# Patient Record
Sex: MALE | Race: OTHER | NOT HISPANIC OR LATINO
[De-identification: names, ages, dates, MRNs, and addresses within clinical notes are randomized per-mention and may not be internally consistent; named-entity substitution may affect disease eponyms.]

---

## 2017-02-24 ENCOUNTER — TRANSCRIPTION ENCOUNTER (OUTPATIENT)
Age: 27
End: 2017-02-24

## 2017-05-24 ENCOUNTER — TRANSCRIPTION ENCOUNTER (OUTPATIENT)
Age: 27
End: 2017-05-24

## 2017-06-12 ENCOUNTER — TRANSCRIPTION ENCOUNTER (OUTPATIENT)
Age: 27
End: 2017-06-12

## 2019-02-05 ENCOUNTER — APPOINTMENT (OUTPATIENT)
Dept: UROLOGY | Facility: CLINIC | Age: 29
End: 2019-02-05
Payer: MEDICARE

## 2019-02-05 VITALS
SYSTOLIC BLOOD PRESSURE: 122 MMHG | BODY MASS INDEX: 39.99 KG/M2 | WEIGHT: 270 LBS | RESPIRATION RATE: 16 BRPM | HEIGHT: 69 IN | DIASTOLIC BLOOD PRESSURE: 88 MMHG | HEART RATE: 61 BPM

## 2019-02-05 DIAGNOSIS — Z63.5 DISRUPTION OF FAMILY BY SEPARATION AND DIVORCE: ICD-10-CM

## 2019-02-05 DIAGNOSIS — Z80.3 FAMILY HISTORY OF MALIGNANT NEOPLASM OF BREAST: ICD-10-CM

## 2019-02-05 DIAGNOSIS — Z82.49 FAMILY HISTORY OF ISCHEMIC HEART DISEASE AND OTHER DISEASES OF THE CIRCULATORY SYSTEM: ICD-10-CM

## 2019-02-05 DIAGNOSIS — Z87.891 PERSONAL HISTORY OF NICOTINE DEPENDENCE: ICD-10-CM

## 2019-02-05 DIAGNOSIS — Z83.3 FAMILY HISTORY OF DIABETES MELLITUS: ICD-10-CM

## 2019-02-05 DIAGNOSIS — Z87.39 PERSONAL HISTORY OF OTHER DISEASES OF THE MUSCULOSKELETAL SYSTEM AND CONNECTIVE TISSUE: ICD-10-CM

## 2019-02-05 PROBLEM — Z00.00 ENCOUNTER FOR PREVENTIVE HEALTH EXAMINATION: Status: ACTIVE | Noted: 2019-02-05

## 2019-02-05 PROCEDURE — 99204 OFFICE O/P NEW MOD 45 MIN: CPT

## 2019-02-05 RX ORDER — BUDESONIDE AND FORMOTEROL FUMARATE DIHYDRATE 160; 4.5 UG/1; UG/1
AEROSOL RESPIRATORY (INHALATION)
Refills: 0 | Status: ACTIVE | COMMUNITY

## 2019-02-05 SDOH — SOCIAL STABILITY - SOCIAL INSECURITY: DISRUPTION OF FAMILY BY SEPARATION AND DIVORCE: Z63.5

## 2019-02-05 NOTE — ASSESSMENT
[FreeTextEntry1] : Very pleasant 28-year-old gentleman who presents for evaluation of erectile dysfunction and concern for hypogonadism\par -I reviewed the patient's laboratory data with him in detail demonstrating testosterone within normal limits\par -We discussed different treatment options for erectile dysfunction; patient is interested in a trial of sildenafil\par -We discussed the pathophysiology of erectile dysfunction\par -I discussed the risks, benefits, alternatives, and possible side effects of Viagra (sildenafil) therapy with the patient, including but not limited to headache, flushing, upset stomach, blurry vision, change in color vision, vision loss, and priapism with the patient.\par -Patient also presents to the office today with numerous different supplements which he has questions about; I attempted to answer questions regarding supplements to the best of my ability, however we discussed that many supplements are not FDA approved nor had they been tested for efficacy. Additionally, we discussed that I am not familiar with the supplements that he has been taking, and I suggested he speak with his primary care physician regarding the supplements\par -Follow up in one month

## 2019-02-05 NOTE — HISTORY OF PRESENT ILLNESS
[FreeTextEntry1] : Very pleasant 28-year-old gentleman presents for evaluation of erectile dysfunction and concern for hypogonadism. 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 He reports that he used steroids for a total of 2 years. He has not used steroids since then. He reports that after he stopped using steroids he became more fatigued and developed erectile dysfunction. He reports sleeping 7-8 hours per night. He reports that he wakes up rested. He does get erections, however they are not as firm as they used to be. He reports that he recently underwent a scrotal ultrasound which was normal. Additionally, testosterone testing demonstrated a free testosterone of 56.9 and total testosterone at 283, both within normal limits.\par Patient reports that he is in a new relationship, however prior to this she was  and his wife became pregnant, however subsequently lost the baby.\par \par He is interested in medications for erectile dysfunction. [Urinary Retention] : no urinary retention [Urinary Urgency] : no urinary urgency [Urinary Frequency] : no urinary frequency [Nocturia] : no nocturia [Straining] : no straining [Weak Stream] : no weak stream [Intermittency] : no intermittency [Dysuria] : no dysuria [Hematuria - Gross] : no gross hematuria [Bladder Spasm] : no bladder spasm [Abdominal Pain] : no abdominal pain [Flank Pain] : no flank pain [Fever] : no fever [Fatigue] : no fatigue [Nausea] : no nausea [Anorexia] : no anorexia

## 2019-03-05 ENCOUNTER — APPOINTMENT (OUTPATIENT)
Dept: UROLOGY | Facility: CLINIC | Age: 29
End: 2019-03-05
Payer: MEDICAID

## 2019-03-05 VITALS
WEIGHT: 266 LBS | HEIGHT: 69 IN | BODY MASS INDEX: 39.4 KG/M2 | SYSTOLIC BLOOD PRESSURE: 98 MMHG | TEMPERATURE: 98.7 F | DIASTOLIC BLOOD PRESSURE: 62 MMHG

## 2019-03-05 PROCEDURE — 99214 OFFICE O/P EST MOD 30 MIN: CPT

## 2019-03-05 NOTE — ASSESSMENT
[FreeTextEntry1] : Very pleasant 28-year-old gentleman who presents for followup of erectile dysfunction and concern for infertility\par -Continue sildenafil\par -Patient requests a semen analysis and testosterone testing\par -We discussed the risks and benefits of semen analysis; we also discussed that is not considered abnormal cannot achieve pregnancy for one year of attempting intercourse to do so\par -Will do semen analysis\par -FSH\par -LH\par -Testosterone testing\par -SHBG

## 2019-03-05 NOTE — HISTORY OF PRESENT ILLNESS
[FreeTextEntry1] : Very pleasant 28-year-old gentleman who presents for followup of erectile dysfunction and concern for infertility. Patient reports that he used sildenafil with an excellent response to the medication. He reports that he also had intercourse without using the medication, however his rectum was not as firm. He is happy with the medication. He is also concerned about infertility. Patient reports that his ex-wife did get pregnant by him in the past. He has not tried to have children with other partners. He reports that he is in a stable relationship with a single female partner currently, and believes that in the future they will try to have children.\par \par No other complaints. [Urinary Retention] : no urinary retention [Urinary Urgency] : no urinary urgency [Urinary Frequency] : no urinary frequency [Nocturia] : no nocturia [Straining] : no straining [Weak Stream] : no weak stream [Intermittency] : no intermittency [Dysuria] : no dysuria [Hematuria - Gross] : no gross hematuria [Bladder Spasm] : no bladder spasm [Abdominal Pain] : no abdominal pain [Flank Pain] : no flank pain [Fever] : no fever [Fatigue] : no fatigue [Nausea] : no nausea [Anorexia] : no anorexia

## 2019-03-31 LAB
FSH SERPL-MCNC: 3.8 IU/L
LH SERPL-ACNC: 2.5 IU/L

## 2019-04-01 ENCOUNTER — OUTPATIENT (OUTPATIENT)
Dept: OUTPATIENT SERVICES | Facility: HOSPITAL | Age: 29
LOS: 1 days | End: 2019-04-01
Payer: MEDICAID

## 2019-04-01 PROCEDURE — G9001: CPT

## 2019-04-12 ENCOUNTER — APPOINTMENT (OUTPATIENT)
Dept: UROLOGY | Facility: CLINIC | Age: 29
End: 2019-04-12
Payer: MEDICAID

## 2019-04-12 VITALS
WEIGHT: 262 LBS | BODY MASS INDEX: 38.8 KG/M2 | SYSTOLIC BLOOD PRESSURE: 132 MMHG | DIASTOLIC BLOOD PRESSURE: 71 MMHG | HEIGHT: 69 IN | HEART RATE: 75 BPM

## 2019-04-12 LAB
SHBG-ESOTERIX: 28.1 NMOL/L
TESTOST BND SERPL-MCNC: 6.2 PG/ML
TESTOST SERPL-MCNC: 263.4 NG/DL

## 2019-04-12 PROCEDURE — 99214 OFFICE O/P EST MOD 30 MIN: CPT

## 2019-04-12 NOTE — PHYSICAL EXAM
[General Appearance - Well Nourished] : well nourished [Normal Appearance] : normal appearance [General Appearance - Well Developed] : well developed [Abdomen Soft] : soft [General Appearance - In No Acute Distress] : no acute distress [Well Groomed] : well groomed [Abdomen Tenderness] : non-tender [Costovertebral Angle Tenderness] : no ~M costovertebral angle tenderness [Urinary Bladder Findings] : the bladder was normal on palpation [Edema] : no peripheral edema [] : no respiratory distress [Respiration, Rhythm And Depth] : normal respiratory rhythm and effort [Exaggerated Use Of Accessory Muscles For Inspiration] : no accessory muscle use [Affect] : the affect was normal [Oriented To Time, Place, And Person] : oriented to person, place, and time [Mood] : the mood was normal [Not Anxious] : not anxious [No Focal Deficits] : no focal deficits [Normal Station and Gait] : the gait and station were normal for the patient's age [No Palpable Adenopathy] : no palpable adenopathy

## 2019-04-12 NOTE — HISTORY OF PRESENT ILLNESS
[FreeTextEntry1] : Very pleasant 28-year-old gentleman who presents for followup of erectile dysfunction and concern for infertility with new finding of low testosterone. Patient reports that he used sildenafil with an excellent response to the medication. He is happy with the medication. He is also concerned about infertility. Patient reports that his ex-wife did get pregnant by him in the past. He has not tried to have children with other partners. He reports that he is in a stable relationship with a single female partner currently, and believes that in the future they will try to have children. He has not had semen analysis done yet. Recent T testing showed total testosterone of 263.4 and free Testosterone of 6.2. FHS, LH normal.\par \par No other complaints. [Urinary Retention] : no urinary retention [Urinary Frequency] : no urinary frequency [Urinary Urgency] : no urinary urgency [Straining] : no straining [Nocturia] : no nocturia [Intermittency] : no intermittency [Weak Stream] : no weak stream [Dysuria] : no dysuria [Bladder Spasm] : no bladder spasm [Abdominal Pain] : no abdominal pain [Hematuria - Gross] : no gross hematuria [Flank Pain] : no flank pain [Fever] : no fever [Fatigue] : no fatigue [Nausea] : no nausea [Anorexia] : no anorexia

## 2019-04-12 NOTE — ASSESSMENT
[FreeTextEntry1] : Very pleasant 28-year-old gentleman presents for followup of low testosterone, erectile dysfunction, concern for infertility\par -Labs reviewed with the patient in detail\par -Continue sildenafil-refill sent to the pharmacy\par -Semen analysis\par -Follow up in one year\par -Will call with results of semen analysis

## 2019-04-15 DIAGNOSIS — Z71.89 OTHER SPECIFIED COUNSELING: ICD-10-CM

## 2019-06-17 ENCOUNTER — MESSAGE (OUTPATIENT)
Age: 29
End: 2019-06-17

## 2019-06-28 ENCOUNTER — APPOINTMENT (OUTPATIENT)
Dept: UROLOGY | Facility: CLINIC | Age: 29
End: 2019-06-28
Payer: MEDICAID

## 2019-06-28 VITALS — HEART RATE: 85 BPM | SYSTOLIC BLOOD PRESSURE: 127 MMHG | DIASTOLIC BLOOD PRESSURE: 24 MMHG | RESPIRATION RATE: 16 BRPM

## 2019-06-28 LAB
TESTOST BND SERPL-MCNC: 7.3 PG/ML
TESTOST SERPL-MCNC: 238.5 NG/DL

## 2019-06-28 PROCEDURE — 99213 OFFICE O/P EST LOW 20 MIN: CPT

## 2019-06-28 NOTE — ASSESSMENT
[FreeTextEntry1] : Very pleasant 28-year-old gentleman who presents for followup of low testosterone and erectile dysfunction, concern for infertility\par -Semen analysis\par -Recheck testosterone in 3 months

## 2019-06-28 NOTE — PHYSICAL EXAM
[General Appearance - Well Developed] : well developed [Normal Appearance] : normal appearance [General Appearance - Well Nourished] : well nourished [Well Groomed] : well groomed [General Appearance - In No Acute Distress] : no acute distress [Abdomen Soft] : soft [Abdomen Tenderness] : non-tender [Urinary Bladder Findings] : the bladder was normal on palpation [Costovertebral Angle Tenderness] : no ~M costovertebral angle tenderness [Edema] : no peripheral edema [] : no respiratory distress [Respiration, Rhythm And Depth] : normal respiratory rhythm and effort [Exaggerated Use Of Accessory Muscles For Inspiration] : no accessory muscle use [Oriented To Time, Place, And Person] : oriented to person, place, and time [Mood] : the mood was normal [Affect] : the affect was normal [Not Anxious] : not anxious [Normal Station and Gait] : the gait and station were normal for the patient's age [No Focal Deficits] : no focal deficits [No Palpable Adenopathy] : no palpable adenopathy

## 2019-06-28 NOTE — HISTORY OF PRESENT ILLNESS
1. Take juice, alphonso aid out of diet, no sugar, no caffeine  2. Continue probiotic  3. Labs  4. Stool studies  5. Bathroom note  6. pediasure chocolate 2 per day   7.KUB today    [FreeTextEntry1] : Very pleasant 28-year-old gentleman who presents for followup of low testosterone and concern for infertility. Patient has not done semen analysis as of yet. He recently had testosterone testing repeated which demonstrated a free testosterone of 7.3, up from 6.2 and total testosterone of 238.5, down from 263.4. He reports feeling much better. He reports that he is getting back into the gym. He reports sleeping approximate 6-8 hours per night. He reports spontaneous erections have been returning without the use of medication. [Urinary Retention] : no urinary retention [Urinary Urgency] : no urinary urgency [Urinary Frequency] : no urinary frequency [Nocturia] : no nocturia [Straining] : no straining [Weak Stream] : no weak stream [Intermittency] : no intermittency [Hematuria - Gross] : no gross hematuria [Dysuria] : no dysuria [Bladder Spasm] : no bladder spasm [Abdominal Pain] : no abdominal pain [Flank Pain] : no flank pain [Fever] : no fever [Fatigue] : no fatigue [Nausea] : no nausea [Anorexia] : no anorexia

## 2019-08-07 ENCOUNTER — APPOINTMENT (OUTPATIENT)
Dept: UROLOGY | Facility: CLINIC | Age: 29
End: 2019-08-07
Payer: MEDICAID

## 2019-08-07 VITALS
HEIGHT: 69 IN | WEIGHT: 255 LBS | HEART RATE: 77 BPM | BODY MASS INDEX: 37.77 KG/M2 | SYSTOLIC BLOOD PRESSURE: 131 MMHG | DIASTOLIC BLOOD PRESSURE: 76 MMHG

## 2019-08-07 DIAGNOSIS — Z87.09 PERSONAL HISTORY OF OTHER DISEASES OF THE RESPIRATORY SYSTEM: ICD-10-CM

## 2019-08-07 PROCEDURE — 99214 OFFICE O/P EST MOD 30 MIN: CPT

## 2019-08-07 NOTE — ASSESSMENT
[FreeTextEntry1] : Very pleasant 28-year-old gentleman who presents for followup of infertility\par -Reviewed results of testosterone\par -Reviewed results of semen analysis in detail\par -Patient is currently not interested in having a child\par -We discussed the potential for conception naturally\par -Follow up in one year and repeat testosterone at that time\par -Will repeat semen analysis if patient is unable to achieve pregnancy in the future when he and his partner begin to try to have a child

## 2019-08-07 NOTE — HISTORY OF PRESENT ILLNESS
[FreeTextEntry1] : Very pleasant 28-year-old gentleman who presents for followup of infertility. Patient recently had semen analysis performed which demonstrated normal volume, normal concentration, low motility at 33%. He reports that his previous wife became pregnant in 2017 but had a miscarriage. He reports no problems with ejaculation. Free testosterone normal a total testosterone slightly low. [Urinary Retention] : no urinary retention [Urinary Urgency] : no urinary urgency [Urinary Frequency] : no urinary frequency [Nocturia] : no nocturia [Straining] : no straining [Intermittency] : no intermittency [Weak Stream] : no weak stream [Dysuria] : no dysuria [Bladder Spasm] : no bladder spasm [Hematuria - Gross] : no gross hematuria [Abdominal Pain] : no abdominal pain [Flank Pain] : no flank pain [Fever] : no fever [Fatigue] : no fatigue [Anorexia] : no anorexia [Nausea] : no nausea

## 2019-08-07 NOTE — PHYSICAL EXAM
[General Appearance - Well Developed] : well developed [General Appearance - Well Nourished] : well nourished [Normal Appearance] : normal appearance [Well Groomed] : well groomed [General Appearance - In No Acute Distress] : no acute distress [Abdomen Soft] : soft [Costovertebral Angle Tenderness] : no ~M costovertebral angle tenderness [Abdomen Tenderness] : non-tender [Urinary Bladder Findings] : the bladder was normal on palpation [] : no respiratory distress [Edema] : no peripheral edema [Respiration, Rhythm And Depth] : normal respiratory rhythm and effort [Oriented To Time, Place, And Person] : oriented to person, place, and time [Exaggerated Use Of Accessory Muscles For Inspiration] : no accessory muscle use [Mood] : the mood was normal [Affect] : the affect was normal [Not Anxious] : not anxious [No Focal Deficits] : no focal deficits [Normal Station and Gait] : the gait and station were normal for the patient's age [No Palpable Adenopathy] : no palpable adenopathy

## 2020-09-22 ENCOUNTER — APPOINTMENT (OUTPATIENT)
Dept: UROLOGY | Facility: CLINIC | Age: 30
End: 2020-09-22
Payer: MEDICAID

## 2020-09-22 VITALS — HEART RATE: 76 BPM | DIASTOLIC BLOOD PRESSURE: 60 MMHG | SYSTOLIC BLOOD PRESSURE: 98 MMHG | TEMPERATURE: 98 F

## 2020-09-22 DIAGNOSIS — N46.9 MALE INFERTILITY, UNSPECIFIED: ICD-10-CM

## 2020-09-22 DIAGNOSIS — N52.9 MALE ERECTILE DYSFUNCTION, UNSPECIFIED: ICD-10-CM

## 2020-09-22 PROCEDURE — 99213 OFFICE O/P EST LOW 20 MIN: CPT

## 2020-09-22 RX ORDER — SILDENAFIL 20 MG/1
20 TABLET ORAL DAILY
Qty: 90 | Refills: 1 | Status: ACTIVE | COMMUNITY
Start: 2019-02-05 | End: 1900-01-01

## 2020-09-22 NOTE — HISTORY OF PRESENT ILLNESS
[FreeTextEntry1] : Very pleasant 29-year-old gentleman who presents for follow-up of low testosterone, erectile dysfunction, concern for infertility.  Patient reports that his erections are significantly improved on Viagra.  He reports no problems achieving or maintaining erection when he takes the medication.  Additionally, he reports less fatigue than previously.  He is exercising regularly.  He reports that his girlfriend may be pregnant as she recently missed her menstrual period 5 days ago.  They have not actively been trying to have a child yet.  He denies other complaints. [Urinary Retention] : no urinary retention [Urinary Urgency] : no urinary urgency [Urinary Frequency] : no urinary frequency [Nocturia] : no nocturia [Straining] : no straining [Weak Stream] : no weak stream [Intermittency] : no intermittency [Dysuria] : no dysuria [Hematuria - Gross] : no gross hematuria [Bladder Spasm] : no bladder spasm [Abdominal Pain] : no abdominal pain [Flank Pain] : no flank pain [Fever] : no fever [Fatigue] : no fatigue [Nausea] : no nausea [Anorexia] : no anorexia

## 2020-09-22 NOTE — ASSESSMENT
[FreeTextEntry1] : Very pleasant 29-year-old gentleman who presents for follow-up of erectile dysfunction, concern for infertility, hypogonadism\par -FSH\par -LH\par -Prolactin\par -SHBG\par -Testosterone testing\par -Continue sildenafil–refill sent to the pharmacy\par -Follow-up in 1 year or sooner if necessary

## 2020-09-27 LAB
FSH SERPL-MCNC: 3.7 IU/L
LH SERPL-ACNC: 3 IU/L
PROLACTIN SERPL-MCNC: 12.8 NG/ML

## 2020-09-29 LAB — SHBG SERPL-SCNC: 19 NMOL/L

## 2020-09-30 LAB
TESTOST BND SERPL-MCNC: 8.9 PG/ML
TESTOST SERPL-MCNC: 277.4 NG/DL

## 2021-09-29 ENCOUNTER — APPOINTMENT (OUTPATIENT)
Dept: UROLOGY | Facility: CLINIC | Age: 31
End: 2021-09-29

## 2021-10-08 ENCOUNTER — APPOINTMENT (OUTPATIENT)
Dept: UROLOGY | Facility: CLINIC | Age: 31
End: 2021-10-08

## 2023-03-15 ENCOUNTER — APPOINTMENT (OUTPATIENT)
Dept: UROLOGY | Facility: CLINIC | Age: 33
End: 2023-03-15
Payer: COMMERCIAL

## 2023-03-15 DIAGNOSIS — R79.89 OTHER SPECIFIED ABNORMAL FINDINGS OF BLOOD CHEMISTRY: ICD-10-CM

## 2023-03-15 DIAGNOSIS — N50.3 CYST OF EPIDIDYMIS: ICD-10-CM

## 2023-03-15 DIAGNOSIS — N50.82 SCROTAL PAIN: ICD-10-CM

## 2023-03-15 PROCEDURE — 99214 OFFICE O/P EST MOD 30 MIN: CPT

## 2023-03-15 NOTE — HISTORY OF PRESENT ILLNESS
[FreeTextEntry1] : Very pleasant 32-year-old gentleman who presents for follow-up of hypogonadism, scrotal discomfort, epididymal cysts.  Patient recently had testosterone testing performed with his primary care physician which demonstrated a total testosterone of 280.  He reports fatigue at times.  He reports weight gain recently.  He reports that he tore a ligament in his right scapula and is unable to exercise regularly at this time.\par \par He also reports that he underwent a scrotal ultrasound approximately 6 years ago which demonstrated bilateral epididymal cysts.  He reports intermittent scrotal pain.

## 2023-03-15 NOTE — PHYSICAL EXAM
[General Appearance - Well Developed] : well developed [General Appearance - Well Nourished] : well nourished [Normal Appearance] : normal appearance [Well Groomed] : well groomed [General Appearance - In No Acute Distress] : no acute distress [Abdomen Soft] : soft [Abdomen Tenderness] : non-tender [Costovertebral Angle Tenderness] : no ~M costovertebral angle tenderness [Urethral Meatus] : meatus normal [Urinary Bladder Findings] : the bladder was normal on palpation [Scrotum] : the scrotum was normal [Testes Mass (___cm)] : there were no testicular masses [FreeTextEntry1] : No palpable scrotal lesions, including epididymal cysts [Edema] : no peripheral edema [] : no respiratory distress [Respiration, Rhythm And Depth] : normal respiratory rhythm and effort [Exaggerated Use Of Accessory Muscles For Inspiration] : no accessory muscle use [Oriented To Time, Place, And Person] : oriented to person, place, and time [Affect] : the affect was normal [Mood] : the mood was normal [Not Anxious] : not anxious [Normal Station and Gait] : the gait and station were normal for the patient's age [No Focal Deficits] : no focal deficits [No Palpable Adenopathy] : no palpable adenopathy

## 2023-03-15 NOTE — ASSESSMENT
[FreeTextEntry1] : Very pleasant 32-year-old gentleman who presents for follow-up of low testosterone with new complaint of intermittent bilateral scrotal pain, epididymal cysts\par -Ultrasound report from 2017 reviewed demonstrating bilateral epididymal cysts\par -We discussed potential etiologies of scrotal pain\par -Testosterone 280\par -We discussed options for management of hypogonadism, including testosterone replacement versus natural testosterone supplementation\par -We discussed the importance of maintaining a healthy diet and exercise\par -PSA 1.4\par -RPR nonreactive\par -TSH 1.26\par -A1c 5.3%\par -Repeat scrotal ultrasound in approximately 2 weeks and follow-up at that time

## 2023-04-04 ENCOUNTER — APPOINTMENT (OUTPATIENT)
Dept: UROLOGY | Facility: CLINIC | Age: 33
End: 2023-04-04

## 2024-03-09 ENCOUNTER — EMERGENCY (EMERGENCY)
Facility: HOSPITAL | Age: 34
LOS: 1 days | Discharge: HOME | End: 2024-03-09
Attending: EMERGENCY MEDICINE
Payer: OTHER GOVERNMENT

## 2024-03-09 VITALS
HEIGHT: 68 IN | DIASTOLIC BLOOD PRESSURE: 89 MMHG | SYSTOLIC BLOOD PRESSURE: 127 MMHG | WEIGHT: 265 LBS | RESPIRATION RATE: 18 BRPM | TEMPERATURE: 98 F | OXYGEN SATURATION: 98 % | HEART RATE: 60 BPM

## 2024-03-09 LAB
ALBUMIN SERPL ELPH-MCNC: 3.8 G/DL — SIGNIFICANT CHANGE UP (ref 3.5–5)
ALP SERPL-CCNC: 54 U/L — SIGNIFICANT CHANGE UP (ref 40–120)
ALT FLD-CCNC: 51 U/L DA — SIGNIFICANT CHANGE UP (ref 10–60)
ANION GAP SERPL CALC-SCNC: 6 MMOL/L — SIGNIFICANT CHANGE UP (ref 5–17)
AST SERPL-CCNC: 27 U/L — SIGNIFICANT CHANGE UP (ref 10–40)
BASOPHILS # BLD AUTO: 0.07 K/UL — SIGNIFICANT CHANGE UP (ref 0–0.2)
BASOPHILS NFR BLD AUTO: 0.8 % — SIGNIFICANT CHANGE UP (ref 0–2)
BILIRUB SERPL-MCNC: 0.6 MG/DL — SIGNIFICANT CHANGE UP (ref 0.2–1.2)
BUN SERPL-MCNC: 15 MG/DL — SIGNIFICANT CHANGE UP (ref 7–18)
CALCIUM SERPL-MCNC: 9 MG/DL — SIGNIFICANT CHANGE UP (ref 8.4–10.5)
CHLORIDE SERPL-SCNC: 107 MMOL/L — SIGNIFICANT CHANGE UP (ref 96–108)
CO2 SERPL-SCNC: 27 MMOL/L — SIGNIFICANT CHANGE UP (ref 22–31)
CREAT SERPL-MCNC: 1.12 MG/DL — SIGNIFICANT CHANGE UP (ref 0.5–1.3)
EGFR: 88 ML/MIN/1.73M2 — SIGNIFICANT CHANGE UP
EOSINOPHIL # BLD AUTO: 0.24 K/UL — SIGNIFICANT CHANGE UP (ref 0–0.5)
EOSINOPHIL NFR BLD AUTO: 2.9 % — SIGNIFICANT CHANGE UP (ref 0–6)
GLUCOSE SERPL-MCNC: 94 MG/DL — SIGNIFICANT CHANGE UP (ref 70–99)
HCT VFR BLD CALC: 49.7 % — SIGNIFICANT CHANGE UP (ref 39–50)
HGB BLD-MCNC: 15.7 G/DL — SIGNIFICANT CHANGE UP (ref 13–17)
IMM GRANULOCYTES NFR BLD AUTO: 0.4 % — SIGNIFICANT CHANGE UP (ref 0–0.9)
LYMPHOCYTES # BLD AUTO: 4.15 K/UL — HIGH (ref 1–3.3)
LYMPHOCYTES # BLD AUTO: 49.5 % — HIGH (ref 13–44)
MCHC RBC-ENTMCNC: 24.8 PG — LOW (ref 27–34)
MCHC RBC-ENTMCNC: 31.6 GM/DL — LOW (ref 32–36)
MCV RBC AUTO: 78.6 FL — LOW (ref 80–100)
MONOCYTES # BLD AUTO: 0.64 K/UL — SIGNIFICANT CHANGE UP (ref 0–0.9)
MONOCYTES NFR BLD AUTO: 7.6 % — SIGNIFICANT CHANGE UP (ref 2–14)
NEUTROPHILS # BLD AUTO: 3.25 K/UL — SIGNIFICANT CHANGE UP (ref 1.8–7.4)
NEUTROPHILS NFR BLD AUTO: 38.8 % — LOW (ref 43–77)
NRBC # BLD: 0 /100 WBCS — SIGNIFICANT CHANGE UP (ref 0–0)
PLATELET # BLD AUTO: 196 K/UL — SIGNIFICANT CHANGE UP (ref 150–400)
POTASSIUM SERPL-MCNC: 4.4 MMOL/L — SIGNIFICANT CHANGE UP (ref 3.5–5.3)
POTASSIUM SERPL-SCNC: 4.4 MMOL/L — SIGNIFICANT CHANGE UP (ref 3.5–5.3)
PROT SERPL-MCNC: 7.1 G/DL — SIGNIFICANT CHANGE UP (ref 6–8.3)
RBC # BLD: 6.32 M/UL — HIGH (ref 4.2–5.8)
RBC # FLD: 17.2 % — HIGH (ref 10.3–14.5)
SODIUM SERPL-SCNC: 140 MMOL/L — SIGNIFICANT CHANGE UP (ref 135–145)
WBC # BLD: 8.38 K/UL — SIGNIFICANT CHANGE UP (ref 3.8–10.5)
WBC # FLD AUTO: 8.38 K/UL — SIGNIFICANT CHANGE UP (ref 3.8–10.5)

## 2024-03-09 PROCEDURE — 96374 THER/PROPH/DIAG INJ IV PUSH: CPT | Mod: XU

## 2024-03-09 PROCEDURE — 70498 CT ANGIOGRAPHY NECK: CPT | Mod: MC

## 2024-03-09 PROCEDURE — 80053 COMPREHEN METABOLIC PANEL: CPT

## 2024-03-09 PROCEDURE — 99284 EMERGENCY DEPT VISIT MOD MDM: CPT | Mod: 25

## 2024-03-09 PROCEDURE — 85025 COMPLETE CBC W/AUTO DIFF WBC: CPT

## 2024-03-09 PROCEDURE — 99285 EMERGENCY DEPT VISIT HI MDM: CPT

## 2024-03-09 PROCEDURE — 70498 CT ANGIOGRAPHY NECK: CPT | Mod: 26,MC

## 2024-03-09 PROCEDURE — 36415 COLL VENOUS BLD VENIPUNCTURE: CPT

## 2024-03-09 PROCEDURE — 70496 CT ANGIOGRAPHY HEAD: CPT | Mod: 26,MC

## 2024-03-09 PROCEDURE — 96375 TX/PRO/DX INJ NEW DRUG ADDON: CPT | Mod: XU

## 2024-03-09 PROCEDURE — 70496 CT ANGIOGRAPHY HEAD: CPT | Mod: MC

## 2024-03-09 RX ORDER — METOCLOPRAMIDE HCL 10 MG
10 TABLET ORAL ONCE
Refills: 0 | Status: COMPLETED | OUTPATIENT
Start: 2024-03-09 | End: 2024-03-09

## 2024-03-09 RX ORDER — KETOROLAC TROMETHAMINE 30 MG/ML
30 SYRINGE (ML) INJECTION ONCE
Refills: 0 | Status: DISCONTINUED | OUTPATIENT
Start: 2024-03-09 | End: 2024-03-09

## 2024-03-09 RX ADMIN — Medication 10 MILLIGRAM(S): at 16:59

## 2024-03-09 RX ADMIN — Medication 30 MILLIGRAM(S): at 16:59

## 2024-03-09 RX ADMIN — Medication 30 MILLIGRAM(S): at 17:29

## 2024-03-09 NOTE — ED ADULT NURSE NOTE - NSFALLUNIVINTERV_ED_ALL_ED
Bed/Stretcher in lowest position, wheels locked, appropriate side rails in place/Call bell, personal items and telephone in reach/Instruct patient to call for assistance before getting out of bed/chair/stretcher/Non-slip footwear applied when patient is off stretcher/[place] to call system/Physically safe environment - no spills, clutter or unnecessary equipment/Purposeful proactive rounding/Room/bathroom lighting operational, light cord in reach

## 2024-03-09 NOTE — ED PROVIDER NOTE - PATIENT PORTAL LINK FT
You can access the FollowMyHealth Patient Portal offered by [facility] by registering at the following website: [email or web address]. By joining [facility]’s FollowMyHealth portal, you will also be able to view your health information using other applications (apps) compatible with our system.

## 2024-03-09 NOTE — ED PROVIDER NOTE - CARE PROVIDER_API CALL
Zahira Angel)  Neurology  [address], [address] [place] [address]  Phone: [phone]  Fax: [phone]  Follow Up Time:

## 2024-03-09 NOTE — ED PROVIDER NOTE - OBJECTIVE STATEMENT
34 y/o male comes in c/o throbbing head pain worse during intercourse   referred for post coital HA to cet CT   no n/v/d

## 2024-03-09 NOTE — ED PROVIDER NOTE - CLINICAL SUMMARY MEDICAL DECISION MAKING FREE TEXT BOX
pt comes in c/o postcoital HA   no previous episodes    no focal neuro symptoms    CT and Cta neagtive    d/c home

## 2024-07-03 ENCOUNTER — APPOINTMENT (OUTPATIENT)
Dept: NEUROLOGY | Facility: CLINIC | Age: 34
End: 2024-07-03